# Patient Record
Sex: MALE | Race: WHITE | Employment: FULL TIME | URBAN - METROPOLITAN AREA
[De-identification: names, ages, dates, MRNs, and addresses within clinical notes are randomized per-mention and may not be internally consistent; named-entity substitution may affect disease eponyms.]

---

## 2020-12-20 ENCOUNTER — HOSPITAL ENCOUNTER (EMERGENCY)
Age: 55
Discharge: HOME | End: 2020-12-20
Attending: EMERGENCY MEDICINE
Payer: OTHER MISCELLANEOUS

## 2020-12-20 ENCOUNTER — APPOINTMENT (OUTPATIENT)
Dept: GENERAL RADIOLOGY | Age: 55
End: 2020-12-20
Attending: EMERGENCY MEDICINE
Payer: OTHER MISCELLANEOUS

## 2020-12-20 VITALS
RESPIRATION RATE: 16 BRPM | HEART RATE: 65 BPM | OXYGEN SATURATION: 97 % | SYSTOLIC BLOOD PRESSURE: 126 MMHG | HEIGHT: 73 IN | BODY MASS INDEX: 33.4 KG/M2 | TEMPERATURE: 97.3 F | DIASTOLIC BLOOD PRESSURE: 78 MMHG | WEIGHT: 252 LBS

## 2020-12-20 DIAGNOSIS — S61.412A LACERATION OF LEFT HAND WITHOUT FOREIGN BODY, INITIAL ENCOUNTER: Primary | ICD-10-CM

## 2020-12-20 DIAGNOSIS — M79.642 HAND PAIN, LEFT: ICD-10-CM

## 2020-12-20 PROCEDURE — 90715 TDAP VACCINE 7 YRS/> IM: CPT | Performed by: EMERGENCY MEDICINE

## 2020-12-20 PROCEDURE — 99283 EMERGENCY DEPT VISIT LOW MDM: CPT

## 2020-12-20 PROCEDURE — 74011250636 HC RX REV CODE- 250/636: Performed by: EMERGENCY MEDICINE

## 2020-12-20 PROCEDURE — 74011000250 HC RX REV CODE- 250

## 2020-12-20 PROCEDURE — 90471 IMMUNIZATION ADMIN: CPT

## 2020-12-20 PROCEDURE — 73130 X-RAY EXAM OF HAND: CPT

## 2020-12-20 RX ORDER — BACITRACIN 500 UNIT/G
1 PACKET (EA) TOPICAL 3 TIMES DAILY
Status: DISCONTINUED | OUTPATIENT
Start: 2020-12-20 | End: 2020-12-20 | Stop reason: HOSPADM

## 2020-12-20 RX ORDER — BACITRACIN 500 UNIT/G
PACKET (EA) TOPICAL
Status: COMPLETED
Start: 2020-12-20 | End: 2020-12-20

## 2020-12-20 RX ADMIN — Medication 1 PACKET: at 18:54

## 2020-12-20 RX ADMIN — TETANUS TOXOID, REDUCED DIPHTHERIA TOXOID AND ACELLULAR PERTUSSIS VACCINE, ADSORBED 0.5 ML: 5; 2.5; 8; 8; 2.5 SUSPENSION INTRAMUSCULAR at 09:00

## 2020-12-20 RX ADMIN — BACITRACIN 1 PACKET: 500 OINTMENT TOPICAL at 18:54

## 2020-12-20 NOTE — DISCHARGE INSTRUCTIONS
See attached instructions for wound care. Return to emergency department with any signs of infection including redness, swelling, pus. Thank you for allowing us to provide you with medical care today. We realize that you have many choices for your emergency care needs. We thank you for choosing [facility].  Please choose us in the future for any continued health care needs. We hope we addressed all of your medical concerns. We strive to provide excellent quality care in the Emergency Department. Anything less than excellent does not meet our expectations. The exam and treatment you received in the Emergency Department were for an emergent problem and are not intended as complete care. It is important that you follow up with a doctor, nurse practitioner, or  [id number] assistant for ongoing care. If your symptoms worsen or you do not improve as expected and you are unable to reach your usual health care provider, you should return to the Emergency Department. We are available 24 hours a day. Take this sheet with you when you go to your follow-up visit. If you have any problem arranging the follow-up visit, contact the Emergency Department immediately. Make an appointment your family doctor for follow up of this visit. Return to the ER if you are unable to be seen in a timely manner.

## 2020-12-20 NOTE — ED PROVIDER NOTES
Patient is a 49-year-old male with no significant past medical history presenting to emergency department for evaluation of wound to the left dorsal hand. Patient reports that at work yesterday around 4 PM he was on a tractor and a 30 pound metal shelving unit fell onto his hand. He reports that the area was immediately bleeding but has stopped prior to arrival, and that he has been cleaning the area with bacitracin and keeping a bandage on it. He is not sure when his last tetanus shot was. He is right-hand dominant. He denies any additional medical complaints or injuries at this time. Please note that this dictation was completed with [employer], the computer voice recognition software.  Quite often unanticipated grammatical, syntax, homophones, and other interpretive errors are inadvertently transcribed by the computer software.  Please disregard these errors.  Please excuse any errors that have escaped final proofreading. No past medical history on file. No past surgical history on file. No family history on file.     Social History     Socioeconomic History    Marital status: SINGLE     Spouse name: Not on file    Number of children: Not on file    Years of education: Not on file    Highest education level: Not on file   Occupational History    Not on file   Social Needs    Financial resource strain: Not on file    Food insecurity     Worry: Not on file     Inability: Not on file    Transportation needs     Medical: Not on file     Non-medical: Not on file   Tobacco Use    Smoking status: Not on file   Substance and Sexual Activity    Alcohol use: Not on file    Drug use: Not on file    Sexual activity: Not on file   Lifestyle    Physical activity     Days per week: Not on file     Minutes per session: Not on file    Stress: Not on file   Relationships    Social connections     Talks on phone: Not on file     Gets together: Not on file     Attends Rastafarian service: Not on file     Active member of club or organization: Not on file     Attends meetings of clubs or organizations: Not on file     Relationship status: Not on file    Intimate partner violence     Fear of current or ex partner: Not on file     Emotionally abused: Not on file     Physically abused: Not on file     Forced sexual activity: Not on file   Other Topics Concern    Not on file   Social History Narrative    Not on file         ALLERGIES: Erythromycin    Review of Systems   Constitutional: Negative for chills and fever. HENT: Negative for ear pain and sore throat. Eyes: Negative for visual disturbance. Respiratory: Negative for cough and shortness of breath. Cardiovascular: Negative for chest pain. Gastrointestinal: Negative for abdominal pain. Genitourinary: Negative for flank pain. Musculoskeletal: Positive for arthralgias (left hand). Negative for back pain. Skin: Positive for wound (laceration to left hand). Negative for color change. Neurological: Negative for dizziness and headaches. Psychiatric/Behavioral: Negative for confusion. Vitals:    12/20/20 1751   BP: 126/78   Pulse: 65   Resp: 16   Temp: 97.3 °F (36.3 °C)   SpO2: 97%   Weight: 114.3 kg (252 lb)   Height: 6' 1\" (1.854 m)            Physical Exam  Vitals signs and nursing note reviewed. Constitutional:       General: He is not in acute distress. Appearance: Normal appearance. He is not ill-appearing. HENT:      Head: Normocephalic and atraumatic. Mouth/Throat:      Pharynx: Oropharynx is clear. Eyes:      Extraocular Movements: Extraocular movements intact. Conjunctiva/sclera: Conjunctivae normal.   Neck:      Musculoskeletal: No neck rigidity. Cardiovascular:      Rate and Rhythm: Normal rate and regular rhythm. Pulmonary:      Effort: Pulmonary effort is normal.      Breath sounds: Normal breath sounds. Abdominal:      Palpations: Abdomen is soft. Tenderness:  There is no abdominal tenderness. Musculoskeletal: Normal range of motion. Left hand: He exhibits tenderness and laceration. He exhibits normal range of motion, no bony tenderness, normal capillary refill, no deformity and no swelling. Normal sensation noted. Normal strength noted. Hands:    Skin:     General: Skin is warm and dry. Neurological:      General: No focal deficit present. Mental Status: He is alert and oriented to person, place, and time. Psychiatric:         Mood and Affect: Mood normal.          MDM  Number of Diagnoses or Management Options  Diagnosis management comments: Patient is alert, well-appearing, afebrile, vitals stable. Blunt trauma to the left hand while at work greater than 24 hours ago and sustained a superficial linear laceration. The wound appears to have already started to heal and does not have any open or deep laceration that would requiring suturing at this time. As the wound has been cleaned and has occurred greater than 24 hours ago, I have advised the patient that the wound would do best with healing via secondary intention rather than suturing at this time which may have an increased risk of infection. No signs of cellulitis or wound infection at this time. Surrounding tenderness, no bony crepitus. No motor or sensory deficits to the hand or fingers. X-ray negative for acute bony injury. Tetanus updated. Wound irrigated and cleaned, bacitracin and dressing applied. Patient discharged home with signed Worker's Compensation form. Return precautions outlined for patient to return to ED with any worsening pain, swelling, pus, or bleeding. Discussed patient with ED attending Marly Barnett MD who agrees with current management plan. Amount and/or Complexity of Data Reviewed  Tests in the radiology section of CPT®: reviewed  Discuss the patient with other providers: yes      ED Course as of Dec 20 1838   Maira Real Dec 20, 2020   1136 IMPRESSION: No acute abnormality. XR HAND MIN 3 VIEWS LEFT [EP]      ED Course User Index  [EP] Melita Yip PA     6:56 PM  Pt has been reevaluated. There are no new complaints, changes, or physical findings at this time. Medications have been reviewed w/ pt and/or family. Pt and/or family's questions have been answered. Pt and/or family expressed good understanding of the dx/tx/rx and is in agreement with plan of care. Pt instructed and agreed to f/u w/ PCP and to return to ED upon further deterioration. Pt is ready for discharge. IMPRESSION:  1. Laceration of left hand without foreign body, initial encounter    2. Hand pain, left        PLAN:  1. There are no discharge medications for this patient.     2.   Follow-up Information     Follow up With Specialties Details Why Contact Info    Your Primary Care Provider  Schedule an appointment as soon as possible for a visit       [facility] EMERGENCY DEPT Emergency Medicine Go to  If symptoms worsen [address] [address]  [phone]            Return to ED if worse     Procedures

## 2020-12-20 NOTE — ED TRIAGE NOTES
Pt here for laceration to top of left hand after piece of metal fell on it yesterday around 1600. Pt reports hand is swollen today and laceration is still bleeding. Pt is not UTD on tetanus.